# Patient Record
Sex: FEMALE | Race: WHITE | Employment: FULL TIME | URBAN - METROPOLITAN AREA
[De-identification: names, ages, dates, MRNs, and addresses within clinical notes are randomized per-mention and may not be internally consistent; named-entity substitution may affect disease eponyms.]

---

## 2022-07-13 ENCOUNTER — OFFICE VISIT (OUTPATIENT)
Dept: SURGERY | Age: 27
End: 2022-07-13
Payer: COMMERCIAL

## 2022-07-13 VITALS
DIASTOLIC BLOOD PRESSURE: 84 MMHG | HEART RATE: 65 BPM | OXYGEN SATURATION: 96 % | SYSTOLIC BLOOD PRESSURE: 126 MMHG | HEIGHT: 66 IN | WEIGHT: 288.1 LBS | BODY MASS INDEX: 46.3 KG/M2 | TEMPERATURE: 98.1 F

## 2022-07-13 DIAGNOSIS — Q82.5: Primary | ICD-10-CM

## 2022-07-13 PROCEDURE — 1036F TOBACCO NON-USER: CPT | Performed by: PLASTIC SURGERY

## 2022-07-13 PROCEDURE — G8427 DOCREV CUR MEDS BY ELIG CLIN: HCPCS | Performed by: PLASTIC SURGERY

## 2022-07-13 PROCEDURE — 99204 OFFICE O/P NEW MOD 45 MIN: CPT | Performed by: PLASTIC SURGERY

## 2022-07-13 PROCEDURE — G8417 CALC BMI ABV UP PARAM F/U: HCPCS | Performed by: PLASTIC SURGERY

## 2022-07-13 NOTE — PROGRESS NOTES
Department of Plastic Surgery - Adult  Attending Consult Note      CHIEF COMPLAINT:  Lesion of right forearm    History Obtained From:  patient    HISTORY OF PRESENT ILLNESS:                The patient is a 24 y.o. 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 The patient states the lesion has been there since birth. It has not grown in size since they first noticed the lesion. The lesion has not changed in color and has not been growing. The pt has not had the lesion biopsied previously. The patient has not had the lesion removed previously. The patient states the lesion is never painful or irritated. She notes that the lesion grows thicker and darker hair than the rest of her forearm but that she does shave this. She states she would like the lesion removed so that she can tattoo the area. The pt denies any associated symptoms. Past Medical History:    Past Medical History:   Diagnosis Date    Acquired underactive thyroid     Food allergy      Past Surgical History:    No past surgical history on file. Current Medications:      Current Outpatient Medications   Medication Sig Dispense Refill    levothyroxine (SYNTHROID) 137 MCG tablet take 1 tablet by mouth once daily      norethindrone-ethinyl estradiol-iron (WICHO FE 1.5/30) 1.5-30 MG-MCG tablet take 1 tablet by mouth daily 28 tablet 11     No current facility-administered medications for this visit. Allergies:  Patient has no known allergies.     Social History:   Social History     Socioeconomic History    Marital status: Single     Spouse name: Not on file    Number of children: Not on file    Years of education: Not on file    Highest education level: Not on file   Occupational History    Not on file   Tobacco Use    Smoking status: Never Smoker    Smokeless tobacco: Never Used   Substance and Sexual Activity    Alcohol use: No    Drug use: No    Sexual activity: Never   Other Topics Concern    Not on file Social History Narrative    Not on file     Social Determinants of Health     Financial Resource Strain:     Difficulty of Paying Living Expenses: Not on file   Food Insecurity:     Worried About Running Out of Food in the Last Year: Not on file    Geri of Food in the Last Year: Not on file   Transportation Needs:     Lack of Transportation (Medical): Not on file    Lack of Transportation (Non-Medical): Not on file   Physical Activity:     Days of Exercise per Week: Not on file    Minutes of Exercise per Session: Not on file   Stress:     Feeling of Stress : Not on file   Social Connections:     Frequency of Communication with Friends and Family: Not on file    Frequency of Social Gatherings with Friends and Family: Not on file    Attends Jew Services: Not on file    Active Member of [address] [place] [address] [employer] or Organizations: Not on file    Attends Club or Organization Meetings: Not on file    Marital Status: Not on file   Intimate Partner Violence:     Fear of Current or Ex-Partner: Not on file    Emotionally Abused: Not on file    Physically Abused: Not on file    Sexually Abused: Not on file   Housing Stability:     Unable to Pay for Housing in the Last Year: Not on file    Number of [place] in the Last Year: Not on file    Unstable Housing in the Last Year: Not on file     Family History:   Family History   Problem Relation Age of Onset    Colon Cancer Maternal Grandmother        REVIEW OF SYSTEMS:    CONSTITUTIONAL:  negative for  fevers, chills, sweats and fatigue  EYES: negative for dipolpia or acute vision loss. RESPIRATORY:  negative for  dry cough, cough with sputum, dyspnea, wheezing and chest pain  HENT:negative for pain, headache, difficulty swallowing or nose bleeds.   CARDIOVASCULAR:  negative for  chest pain, dyspnea, palpitations, syncope  GASTROINTESTINAL:  negative for nausea, vomiting, change in bowel habits, diarrhea, constipation and abdominal pain  EXTREMITIES: negative for edema  MUSCULOSKELETAL: negative for muscle weakness  SKIN: positive for lesion,negative for itching or rashes. HEME: negative for easy brusing or bleeding  BEHAVIOR/PSYCH:  negative for poor appetite, increased appetite, decreased sleep and poor concentration    PHYSICAL EXAM:        VITALS:  /84 (Site: Left Upper Arm, Position: Sitting, Cuff Size: Medium Adult)   Pulse 65   Temp 98.1 °F (36.7 °C) (Temporal)   Ht 5' 6\" (1.676 m)   Wt 288 lb 1.6 oz (130.7 kg)   LMP 06/15/2022   SpO2 96%   Breastfeeding No   BMI 46.50 kg/m²   CONSTITUTIONAL:  awake, alert, cooperative, no apparent distress, and appears stated age  EYES: PERRLA, EOMI, no signs of occular infection  LUNGS:  No increased work of breathing, good air exchange  CARDIOVASCULAR:  regular rate and rhythm   EXTREMITIES: no signs of clubbing or cyanosis. MUSCULOSKELETAL: negative for flaccid muscle tone or spastic movements. NEURO: Cranial nerves II-XII grossly intact. No signs of agitated mood. SKIN: Raised lesion on right forearm-  25mm x 11mm, dark brown in color, irregular border, slightly raised, no signs of bleeding,drainage or infection. Non tender to palpation.          DATA:    Labs: CBC:   Lab Results   Component Value Date/Time    WBC 5.02 09/19/2016 12:00 AM    RBC 5.02 09/19/2016 12:00 AM    HGB 13.5 09/19/2016 12:00 AM    HCT 41 09/19/2016 12:00 AM    MCV 81.7 09/19/2016 12:00 AM    MCH 27 09/19/2016 12:00 AM    MCHC 33 09/19/2016 12:00 AM    RDW 14.4 01/24/2014 03:30 PM     09/19/2016 12:00 AM    MPV 9.9 01/24/2014 03:30 PM     BMP:    Lab Results   Component Value Date/Time     01/24/2014 03:30 PM    K 4.3 01/24/2014 03:30 PM     01/24/2014 03:30 PM    CO2 22 01/24/2014 03:30 PM    BUN 10 01/24/2014 03:30 PM    LABALBU 4.7 01/24/2014 03:30 PM    CREATININE 0.9 01/24/2014 03:30 PM    CALCIUM 9.6 01/24/2014 03:30 PM    GLUCOSE 104 01/24/2014 03:30 PM       Radiology Review:  No radiology needed at this time  Pathology Review: No pathology needed at this time      IMPRESSION/RECOMMENDATIONS:        Diagnosis  -) Congenital nevus    -Discussed with patient that since there are no concerning factors such as characteristic changes, irritation, or bleeding this is going to strictly cosmetic procedure. She was quoted for excision in the office under local anesthetic.    -We will plan to proceed and have the lesion excised    -The risks, benefits and options were discussed with the pt. The risks included but not limited to pain, bleeding, infection, heavy scarring, damage to surrounding structures, fluid collections, asymmetry, and need for further procedures. All of Her questions were answered to their satisfaction and She agrees to proceed with the procedure. Photos obtained. Chaperone present for entire visit. FU- 7-14 days      This document is generated, in part, by voice recognition software and thus  syntax and grammatical errors are possible.     Kathye Osgood, DO  9:06 AM  7/13/2022

## 2022-08-24 ENCOUNTER — OFFICE VISIT (OUTPATIENT)
Dept: SURGERY | Age: 27
End: 2022-08-24

## 2022-08-24 VITALS
WEIGHT: 280 LBS | TEMPERATURE: 97.5 F | SYSTOLIC BLOOD PRESSURE: 140 MMHG | DIASTOLIC BLOOD PRESSURE: 84 MMHG | OXYGEN SATURATION: 97 % | BODY MASS INDEX: 45 KG/M2 | HEIGHT: 66 IN

## 2022-08-24 DIAGNOSIS — Q82.5: Primary | ICD-10-CM

## 2022-08-24 PROCEDURE — MISCSR1: Performed by: PLASTIC SURGERY

## 2022-08-24 NOTE — PROGRESS NOTES
Subjective: Follow up today for excision of left arm benign lesion. Denies fever, nausea, vomiting, leg pain or swelling. The patient voices understanding of the procedure they are having today and would like to proceed. Patient states that the mass has been painful and growing. Objective:    BP (!) 140/84 (Site: Left Upper Arm, Position: Sitting, Cuff Size: Large Adult)   Temp 97.5 °F (36.4 °C) (Temporal)   Ht 5' 6\" (1.676 m)   Wt 280 lb (127 kg)   LMP 08/03/2022   SpO2 97%   BMI 45.19 kg/m²       Left arm benign lesion  Lesion- 20mm x 10mm  Margin- 2mm  Defect- 25mm x 15mm  Scar-45mm         Assessment:    There is no problem list on file for this patient. Plan:       Diagnosis  -) Right forearm benign pigmented lesion  -) Pain    -The risks, benefits and options were discussed with the pt. The risks included but not limited to pain, bleeding, infection, heavy scarring, damage to surrounding structures, fluid collections, asymmetry, and need for further procedures. All of Her questions were answered to their satisfaction and She agrees to proceed with the procedure.      -After consent was obtained, the area was cleansed with an alcohol swab. Local anesthesia consisting of 1% lidocaine with 1:100,000 epinepherine was injected  surrounding the area. The local was allowed to work. Using a 10-blade the Right forearm benign pigmented lesion was excised,  intermeidate repair was performed. The wound(s) were closed with 3-0 monocryl and 4-0 monocryl hemostasis was obtained and a steristrip with tegaderm dressing was placed over the wound. The procedure was performed by Dr Shobha Aggarwal       Please schedule an appointment to be seen on Follow-up with our office in 7-14 days  Diet: regular diet  Activity: no heavy lifting for 7 days. Shower/Bathing: OK to shower over the wound site in 48 hours. No baths, hot tubs or soaking of the wound site at this time. Pt voices understanding.      Wound care: There is a dressing in place steri strips and Tegaderm over the wound site. This will remain intact until seen in our office. Medications: As prescribed  Educated to contact physician with concerns or signs of infection (redness, increasing pain, discharge, odor, fever). The entirety of the procedure was performed by Dr. Isatu Rooney with first assistance by SARATH Toledo      Please note that the medical decision making for the patient as well as the subjective, objective, assessment and plan were reviewed and performed by Dr Isatu Rooney        This document is generated, in part, by voice recognition software and thus  syntax and grammatical errors are possible.     Naomi Nunez MD  11:18 AM  8/25/2022

## 2022-08-29 NOTE — PROGRESS NOTES
Subjective: Follow up today from excision of right arm benign lesion. Denies fever, nausea, vomiting, leg pain or swelling. She voices no complaints at this time presents today for continued wound examination. Objective:    LMP 08/03/2022       Right arm incision line clean dry intact no signs of infection maturing as expected. Assessment:    There is no problem list on file for this patient. Plan:       Diagnosis  -) Right forearm benign pigmented lesion    Educated the patient on scar care. Scar Care Instructions      Sunscreen Recommendations for Scars  We recommend that all patients use a sunscreen when outside but especially on new scars (that are exposed to sunlight) for a year after their procedure. The SPF should be at least 28. Follow the application directions on the bottle. Why is it so Important to Use Sunscreen on Scars? A scar is new and more fragile than the surrounding skin. If you do not use sunscreen, the scar line will react differently to the sun than the surrounding skin. If you don't use sunscreen, the scar tissue will become darker than surrounding skin. This is a hyper-pigmented scar and will remain darker than the other skin. After one year, the scar and surrounding skin should react equally to sun. Superficial Scar Massage  Scar massage desensitizes and reduces scar adhesions so skin glides freely. Rub in a circular motion on and around the scar with firm, even pressure for 5 minutes four times per day   You can start scar massage once incision is completely healed and strong enough to handle the motion (usually 10 - 14 days post operatively). You use lotion to do the scar massage to allow ease with motion over the scar and prevent friction at the area. Patient had no further Questions. Paper instructions given to patient. She did inquire when she can have this scar tattooed over.   I informed her that she should wait till the 1 year postop nathan for complete scar maturity before tattooing over the scar line.   She voiced understanding    No restrictions at this time    Follow-up as needed    Sandra Holley Sepsis hemodynamically stable, immunocompromised send blood culture UA, Urine culture full set of labs chest X-ray to screen for occult pneumonia, broad spectrum antibiotics and admit to medicine.

## 2022-09-02 ENCOUNTER — OFFICE VISIT (OUTPATIENT)
Dept: SURGERY | Age: 27
End: 2022-09-02

## 2022-09-02 VITALS — TEMPERATURE: 97.2 F | RESPIRATION RATE: 20 BRPM

## 2022-09-02 DIAGNOSIS — Q82.5: Primary | ICD-10-CM

## 2022-09-02 PROCEDURE — 99024 POSTOP FOLLOW-UP VISIT: CPT | Performed by: PHYSICIAN ASSISTANT
